# Patient Record
Sex: FEMALE | Race: OTHER | Employment: OTHER | URBAN - METROPOLITAN AREA
[De-identification: names, ages, dates, MRNs, and addresses within clinical notes are randomized per-mention and may not be internally consistent; named-entity substitution may affect disease eponyms.]

---

## 2017-03-05 ENCOUNTER — HOSPITAL ENCOUNTER (EMERGENCY)
Age: 37
Discharge: HOME | End: 2017-03-05
Attending: EMERGENCY MEDICINE
Payer: SELF-PAY

## 2017-03-05 VITALS
RESPIRATION RATE: 16 BRPM | DIASTOLIC BLOOD PRESSURE: 83 MMHG | OXYGEN SATURATION: 100 % | BODY MASS INDEX: 34.55 KG/M2 | WEIGHT: 183 LBS | TEMPERATURE: 98.4 F | SYSTOLIC BLOOD PRESSURE: 141 MMHG | HEIGHT: 61 IN | HEART RATE: 54 BPM

## 2017-03-05 DIAGNOSIS — N63.10 LUMP OF RIGHT BREAST: Primary | ICD-10-CM

## 2017-03-05 PROCEDURE — 99282 EMERGENCY DEPT VISIT SF MDM: CPT

## 2017-03-05 NOTE — ED TRIAGE NOTES
Pt reports she was doing self breast exam yesterday ans noticed a small lump in her right breast.  Pt denies pain to site.

## 2017-03-05 NOTE — DISCHARGE INSTRUCTIONS
Bultos en los senos (no cancerosos): Instrucciones de cuidado - [ Breast Lumps (Noncancerous): Care Instructions ]  Instrucciones de cuidado  Los bultos o cambios en los senos son Katherine Lady preocupación común de mendy entre las mujeres. Las mujeres pueden tener muchos tipos de bultos y [employer] senos a lo lillian de gray kerrie, incluyendo los que ocurren remy la [place], el embarazo y debido al envejecimiento. Madalynn Meek de los bultos en los senos son inofensivos y no son cáncer. Los senos de [email or web address] se sienten llenos de bultos y están sensibles antes de gray período menstrual. Las mujeres también pueden tener bultos remy el período de lactancia. Los bultos en los senos podrían desaparecer después de la menopausia. Los bultos no cancerosos en los senos incluyen:  · Quistes, o sacos llenos de líquido. · Quistes de la piel en la lake del pecho. · Bultos de grasa, que pueden ser firmes. Éstos pueden o no causar dolor. · Fibroadenomas, crecimientos redondos y firmes con bordes lisos. · Abscesos, que son focos de infección dentro del seno. La atención de seguimiento es faith parte clave de gray tratamiento y seguridad. Asegúrese de hacer y acudir a todas las citas, y llame a gray médico si está teniendo problemas. También es faith buena idea saber los resultados de los exámenes y mantener faith lista de los medicamentos que jerald. ¿Cómo puede cuidarse en el hogar? · [place] un analgésico (medicamento para el dolor) de venta jordan, bernabe acetaminofén (Tylenol), ibuprofeno (Advil, Motrin) o naproxeno (Aleve). Heather y siga todas las instrucciones de la [place]. · No tome dos o más analgésicos al mismo tiempo a menos que el médico se lo haya indicado. Muchos analgésicos contienen acetaminofén, es decir, Tylenol. El exceso de acetaminofén (Tylenol) puede ser dañino. · Si está embarazada, no tome ningún medicamento jordan acetaminofén, a menos que gray médico se lo haya indicado.   · Use un sostén con buen soporte, bernabe los que se usan para hacer deporte o correr. · Es posible que Marcio Yoshi la cafeína. Algunas mujeres aseguran que reducir el consumo de cafeína reduce la sensibilidad de los senos. · Faith dieta muy baja en grasa (aproximadamente 15% de la dieta diaria) podría disminuir la sensibilidad en los senos. Hable con gray médico para stefan si debe seguir faith dieta muy baja en grasas. · Faiht dieta baja en sal (sodio) también podría reducir la sensibilidad en los senos. ¿Cuándo debe pedir ayuda? Preste especial atención a los cambios en gray mendy y asegúrese de comunicarse con gray médico si:  · Tiene fiebre. · Tiene hinchazón, enrojecimiento o dolor. · Tiene un nuevo bulto en el seno que no desaparece con gray ciclo menstrual.  · Nota que un bulto en el seno joshua [place]. ¿Dónde puede encontrar más información en inglés? Radu Horton a http://eloy-vahe.info/. Escriba B530 en la búsqueda para aprender más acerca de \"Bultos en los senos (no cancerosos): Instrucciones de cuidado - [ Breast Lumps (Noncancerous): Care Instructions ]. \"  Revisado: 25 febrero, 2016  Versión del contenido: 11.1  © 6762-4240 Healthwise, Incorporated. Las instrucciones de cuidado fueron adaptadas bajo licencia por [employer] (which disclaims liability or warranty for this information). Si usted tiene [place] afección médica o sobre estas instrucciones, siempre pregunte a gray profesional de mendy. Healthwise, Incorporated niega toda garantía o responsabilidad por gray uso de esta información. We hope that we have addressed all of your medical concerns. The examination and treatment you received in the Emergency Department were for an emergent problem and were not intended as complete care. It is important that you follow up with your healthcare provider(s) for ongoing care.  If your symptoms worsen or do not improve as expected, and you are unable to reach your usual health care provider(s), you should return to the Emergency Department. Today's healthcare is undergoing tremendous change, and patient satisfaction surveys are one of the many tools to assess the quality of medical care. You may receive a survey from the [employer] regarding your experience in the Emergency Department. I hope that your experience has been completely positive, particularly the medical care that I provided. As such, please participate in the survey; anything less than excellent does not meet my expectations or intentions. [facility] and [facility] participate in nationally recognized quality of care measures. If your blood pressure is greater than 120/80, as reported below, we urge that you seek medical care to address the potential of high blood pressure, commonly known as hypertension. Hypertension can be hereditary or can be caused by certain medical conditions, pain, stress, or \"white coat syndrome. \"       Please make an appointment with your health care provider(s) for follow up of your Emergency Department visit. VITALS:   Patient Vitals for the past 8 hrs:   Temp Pulse Resp BP SpO2   03/05/17 1102 98.4 °F (36.9 °C) (!) 54 16 141/83 100 %          Thank you for allowing us to provide you with medical care today. We realize that you have many choices for your emergency care needs. Please choose us in the future for any continued health care needs. Brannon Moyer, [address] [facility] [address]: [phone]            No results found for this or any previous visit (from the past 24 hour(s)). No results found.

## 2017-03-05 NOTE — ED PROVIDER NOTES
HPI Comments: The pt is a 43year old female who presents with complaints of lump in breast discovered during self breast exam this am. She just finished her period yesterday. No dimpling or nipple discharge. Age of menarche [age] and she has breast fed two children. No history of breast CA in the family. Pt denies fevers, chills, night sweats, chest pain, pressure, SOB, PÉREZ, PND, orthopnea, abdominal pain, n/v/d, melena, hematuria, dysuria, constipation, HA, dizziness, and syncope        History reviewed. No pertinent past medical history. Past Surgical History:  No date: HX TUBAL LIGATION      Patient is a 43 y.o. female presenting with breast mass. The history is provided by the patient. Breast Mass    This is a new problem. The current episode started 1 to 2 hours ago. The problem has not changed since onset. The problem is associated with nothing. There has been no fever. Affected Location: right breast. The patient is experiencing no pain. History reviewed. No pertinent past medical history. Past Surgical History:   Procedure Laterality Date    HX TUBAL LIGATION           History reviewed. No pertinent family history. Social History     Social History    Marital status:      Spouse name: N/A    Number of children: N/A    Years of education: N/A     Occupational History    Not on file. Social History Main Topics    Smoking status: Never Smoker    Smokeless tobacco: Never Used    Alcohol use No    Drug use: Not on file    Sexual activity: Yes     Partners: Male     Other Topics Concern    Not on file     Social History Narrative         ALLERGIES: Review of patient's allergies indicates no known allergies. Review of Systems   Constitutional: Negative for activity change, appetite change, chills, diaphoresis, fatigue, fever and unexpected weight change. HENT: Negative for congestion, ear pain, rhinorrhea, sinus pressure, sore throat and tinnitus.     Eyes: Negative for photophobia, pain, discharge and visual disturbance. Respiratory: Negative for apnea, cough, choking, chest tightness, shortness of breath, wheezing and stridor. Cardiovascular: Negative for chest pain, palpitations and leg swelling. Gastrointestinal: Negative for abdominal pain, constipation, diarrhea, nausea and vomiting. Endocrine: Negative for polydipsia, polyphagia and polyuria. Genitourinary: Negative for decreased urine volume, dyspareunia, dysuria, enuresis, flank pain, frequency, hematuria and urgency. Musculoskeletal: Negative for arthralgias, back pain, gait problem, myalgias and neck pain. Skin: Negative for color change, pallor, rash and wound. Allergic/Immunologic: Negative for immunocompromised state. Neurological: Negative for dizziness, seizures, syncope, weakness, light-headedness and headaches. Hematological: Does not bruise/bleed easily. Psychiatric/Behavioral: Negative for agitation and confusion. The patient is not nervous/anxious. Vitals:    03/05/17 1102   BP: 141/83   Pulse: (!) 54   Resp: 16   Temp: 98.4 °F (36.9 °C)   SpO2: 100%   Weight: 83 kg (183 lb)   Height: 5' 1\" (1.549 m)            Physical Exam   Constitutional: She is oriented to person, place, and time. She appears well-developed and well-nourished. No distress. HENT:   Head: Normocephalic. Right Ear: External ear normal.   Left Ear: External ear normal.   Mouth/Throat: Oropharynx is clear and moist. No oropharyngeal exudate. Eyes: Conjunctivae and EOM are normal. Pupils are equal, round, and reactive to light. Right eye exhibits no discharge. Left eye exhibits no discharge. No scleral icterus. Neck: Normal range of motion. Neck supple. No JVD present. No tracheal deviation present. No thyromegaly present. Cardiovascular: Normal rate, regular rhythm, normal heart sounds and intact distal pulses. Exam reveals no gallop and no friction rub. No murmur heard.   Pulmonary/Chest: Effort normal and breath sounds normal. No stridor. No respiratory distress. She has no wheezes. She has no rales. She exhibits no tenderness and no bony tenderness. Abdominal: Soft. Bowel sounds are normal. She exhibits no distension and no mass. There is no tenderness. There is no rebound and no guarding. Musculoskeletal: Normal range of motion. She exhibits no edema or tenderness. Lymphadenopathy:     She has no cervical adenopathy. Neurological: She is alert and oriented to person, place, and time. She displays normal reflexes. No cranial nerve deficit. Coordination normal.   Skin: Skin is warm and dry. No rash noted. She is not diaphoretic. No erythema. No pallor. Psychiatric: She has a normal mood and affect. Her behavior is normal. Judgment and thought content normal.   Nursing note and vitals reviewed. [facility]  ED Course       Procedures        12:41 PM  Pt has been reevaluated. There are no new complaints, changes, or physical findings at this time. Medications have been reviewed w/ pt and/or family. Pt and/or family's questions have been answered. Pt and/or family expressed good understanding of the dx/tx/rx and is in agreement with plan of care. Pt instructed and agreed to f/u w/ PCP and [facility] and to return to ED upon further deterioration. Pt is ready for discharge. LABORATORY TESTS:  No results found for this or any previous visit (from the past 12 hour(s)). IMAGING RESULTS:  No orders to display     No results found. MEDICATIONS GIVEN:  Medications - No data to display    IMPRESSION:  1. Lump of right breast     Suspect fibrotic breast disease. Low risk for breast CA but recommend mammography and follow up with specialist     PLAN:  1. There are no discharge medications for this patient.     2.   Follow-up Information     Follow up With Details Comments [address] In 3 days  [address] Soares[address]oaquin Deejay[facility] Adrian Tidwell MD In 1 week  [address]  [address] [place] [address] [id number]  [phone]          3.  Supportive care     Return to ED if worse       Philip Oh NP  12:42 PM

## 2019-10-11 ENCOUNTER — HOSPITAL ENCOUNTER (EMERGENCY)
Age: 39
Discharge: HOME | End: 2019-10-11
Attending: STUDENT IN AN ORGANIZED HEALTH CARE EDUCATION/TRAINING PROGRAM
Payer: COMMERCIAL

## 2019-10-11 VITALS
RESPIRATION RATE: 18 BRPM | SYSTOLIC BLOOD PRESSURE: 138 MMHG | OXYGEN SATURATION: 96 % | DIASTOLIC BLOOD PRESSURE: 73 MMHG | TEMPERATURE: 98.4 F | HEART RATE: 63 BPM

## 2019-10-11 DIAGNOSIS — H11.33 SUBCONJUNCTIVAL HEMORRHAGE OF BOTH EYES: Primary | ICD-10-CM

## 2019-10-11 PROCEDURE — 99282 EMERGENCY DEPT VISIT SF MDM: CPT

## 2019-10-11 NOTE — DISCHARGE INSTRUCTIONS
Patient Education        Hemorragia subconjuntival: Instrucciones de cuidado - [ Subconjunctival Hemorrhage: Care Instructions ]  Instrucciones de cuidado    En ocasiones pueden romperse pequeños vasos sanguíneos en lo fierro del go, provocando faith moses o Malon Due. [place] se llama hemorragia subconjuntival. Los vasos sanguíneos se pueden romper cuando usted estornuda, tose, vomita, hace algún esfuerzo o se inclina. A veces no hay faith causa stacia. La mary puede parecer alarmante, sobre todo si la [place] es ed. Si no hay dolor ni cambios en la visión, por lo general no hay razón para preocuparse y la mary desaparecerá por sí tegan poco a poco en cuestión de 2 a 3 semanas. La atención de seguimiento es faith parte clave de gray tratamiento y seguridad. Asegúrese de hacer y acudir a todas las citas, y llame a gray médico si está teniendo problemas. También es faith buena idea saber los resultados de katy exámenes y mantener faith lista de los medicamentos que jerald. ¿Cómo puede cuidarse en el hogar? · Esté alerta a cualquier cambio en el go. Es normal que el punto barton del globo ocular cambie de color [employer]. Al igual que un moretón en la piel, puede pasar de rojizo a [place], david y amarillento. · No tome aspirina ni productos que contengan aspirina, dado que esta puede aumentar el sangrado. [place] acetaminofén (Tylenol) si necesita un analgésico (medicamento para el dolor) para otro problema. · No tome dos o más analgésicos al mismo tiempo a menos que el médico se lo haya indicado. Muchos analgésicos contienen acetaminofén, es decir, Tylenol. El exceso de acetaminofén (Tylenol) puede ser dañino. ¿Cuándo debe pedir ayuda? Llame a gray médico ahora mismo o busque atención médica inmediata si:    · Tiene señales de faith infección en el go, tales bernabe:  ? Pus o faith secreción espesa que sale del go. ? Enrojecimiento o hinchazón alrededor del go.   ? Jameson .     · Ve mary en la parte tay del go (pupila).     · Tiene cambios o problemas en la vista.     · Tiene dolor en el go.    Preste especial atención a los cambios en gray mendy y asegúrese de comunicarse con gray médico si:    · No mejora bernabe se esperaba. ¿Dónde puede encontrar más información en inglés? Valentino Fraise a http://eloy-vahe.info/. Augustine Quevedo A517 en la búsqueda para aprender más acerca de \"Hemorragia subconjuntival: Instrucciones de cuidado - [ Subconjunctival Hemorrhage: Care Instructions ]. \"  Revisado: 5 benjamin, 2019  Versión del contenido: 12.2  © 4095-9688 Healthwise, Incorporated. Las instrucciones de cuidado fueron adaptadas bajo licencia por [employer] (which disclaims liability or warranty for this information). Si usted tiene [place] afección médica o sobre estas instrucciones, siempre pregunte a gray profesional de mendy. [place], [facility] niega toda garantía o responsabilidad por gray uso de esta información.

## 2019-10-11 NOTE — ED TRIAGE NOTES
Patient reports on Wednesday she had a headache and vomiting. Thursday she noticed red spots in her eyes. Today she has slight pain in bilateral eyes.

## 2019-10-11 NOTE — ED PROVIDER NOTES
37 y.o. female with no significant past medical history who presents from home via personal vehicle with chief complaint of lower eye redness. Patient states initial onset of symptoms began on 2 days ago with headaches, vomiting, and cough. Patient reports yesterday after severe coughing episodes increased and she noticed redness and irritation to her lower eye lids bilaterally in her eyes. Patient denies any injuries to her eyes, and reports only wearing glasses while driving. Patient affirms mild photophobia, redness, eye irritation, vomiting, headaches, and cough. Patient denies double vision, blurry vision, ear pain and sore throat. There are no other acute medical concerns at this time. Social hx: Never smoker  PCP: None    Note written by Kale Soler, as dictated by Yovana Blackmon MD 9:14 AM       The history is provided by the patient. No  was used. History reviewed. No pertinent past medical history. Past Surgical History:   Procedure Laterality Date    HX TUBAL LIGATION           History reviewed. No pertinent family history.     Social History     Socioeconomic History    Marital status:      Spouse name: Not on file    Number of children: Not on file    Years of education: Not on file    Highest education level: Not on file   Occupational History    Not on file   Social Needs    Financial resource strain: Not on file    Food insecurity:     Worry: Not on file     Inability: Not on file    Transportation needs:     Medical: Not on file     Non-medical: Not on file   Tobacco Use    Smoking status: Never Smoker    Smokeless tobacco: Never Used   Substance and Sexual Activity    Alcohol use: No    Drug use: Not on file    Sexual activity: Yes     Partners: Male   Lifestyle    Physical activity:     Days per week: Not on file     Minutes per session: Not on file    Stress: Not on file   Relationships    Social connections:     Talks on phone: Not on file     Gets together: Not on file     Attends [employer] service: Not on file     Active member of club or organization: Not on file     Attends meetings of clubs or organizations: Not on file     Relationship status: Not on file    Intimate partner violence:     Fear of current or ex partner: Not on file     Emotionally abused: Not on file     Physically abused: Not on file     Forced sexual activity: Not on file   Other Topics Concern    Not on file   Social History Narrative    Not on file         ALLERGIES: Patient has no known allergies. Review of Systems   Constitutional: Negative for fever. HENT: Negative for sore throat. Eyes: Positive for photophobia and redness. Negative for visual disturbance. Respiratory: Positive for cough. Negative for shortness of breath. Cardiovascular: Negative for chest pain. Gastrointestinal: Positive for vomiting. Negative for abdominal pain. Genitourinary: Negative for dysuria. Musculoskeletal: Negative for back pain. Skin: Negative for rash. Neurological: Positive for headaches. Negative for syncope. Psychiatric/Behavioral: Negative for confusion. All other systems reviewed and are negative. Vitals:    10/11/19 0910   BP: 138/73   Pulse: 63   Resp: 18   Temp: 98.4 °F (36.9 °C)   SpO2: 96%            Physical Exam   Constitutional: She is oriented to person, place, and time. She appears well-developed. No distress. HENT:   Head: Normocephalic and atraumatic. Eyes: EOM are normal.   Mild bilateral inferior subconjunctival hemorrhage, no hyphema   Neck: Normal range of motion. Neck supple. Cardiovascular: Normal rate, regular rhythm and normal heart sounds. Pulmonary/Chest: Effort normal and breath sounds normal. No respiratory distress. Abdominal: Soft. There is no tenderness. There is no guarding. Musculoskeletal: Normal range of motion. She exhibits no edema.    Neurological: She is alert and oriented to person, place, and time. She exhibits normal muscle tone. Skin: Skin is warm and dry.     Note written by [place] RandolphKale, as dictated by Aamir Gaytan MD 9:14 AM       BRETT       Procedures